# Patient Record
Sex: MALE | Race: OTHER
[De-identification: names, ages, dates, MRNs, and addresses within clinical notes are randomized per-mention and may not be internally consistent; named-entity substitution may affect disease eponyms.]

---

## 2019-01-24 ENCOUNTER — HOSPITAL ENCOUNTER (EMERGENCY)
Dept: HOSPITAL 72 - EMR | Age: 54
Discharge: HOME | End: 2019-01-24
Payer: COMMERCIAL

## 2019-01-24 VITALS — HEIGHT: 65 IN | WEIGHT: 150 LBS | BODY MASS INDEX: 24.99 KG/M2

## 2019-01-24 VITALS — SYSTOLIC BLOOD PRESSURE: 128 MMHG | DIASTOLIC BLOOD PRESSURE: 68 MMHG

## 2019-01-24 DIAGNOSIS — Z23: ICD-10-CM
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
## 2019-01-24 DIAGNOSIS — E11.9: ICD-10-CM

## 2019-01-24 DIAGNOSIS — S01.81XA: Primary | ICD-10-CM

## 2019-01-24 PROCEDURE — 90471 IMMUNIZATION ADMIN: CPT

## 2019-01-24 PROCEDURE — 99283 EMERGENCY DEPT VISIT LOW MDM: CPT

## 2019-01-24 PROCEDURE — 90715 TDAP VACCINE 7 YRS/> IM: CPT

## 2019-01-24 NOTE — NUR
ED Nurse Note:



wound cleaned, site intact and stitches inplace, pt discharge instruction and 
prescription provided, pt education done via discussion and hand out, pt 
verbalized understanding and agrees with plan, pt vss, ambulatory w/ steady 
gait.

## 2019-01-24 NOTE — NUR
ED Nurse Note:



pt walked in ED c/o lac upper lip area x2, pt states he was working out and one 
of the plastic part of workout equipment came off and cut above his lips and he 
came to ed to see if he needed stitches. Noted two [id number] lac on upper lip 
area, no drainage at this time, noted redness and swelling, airway intact, resp 
even and unlabored. will cont monitor.

## 2019-01-25 NOTE — EMERGENCY ROOM REPORT
History of Present Illness


General


Chief Complaint:  Laceration


Source:  Patient, Medical Record





Present Illness


HPI


Patient is a 53-year-old male who presented after increased facial pain.  

Patient reports having exercising with an elastic band.  Patient reports this 

striking him in the face.  He denies any loss of consciousness.  He denies 

taking any blood thinners.  Patient states that he is type II diabetic.  He 

reported having pain.  Primarily at his lips.  He denies recent tetanus 

vaccine.  He denies malocclusion.  He denies any other locations of injury.


Allergies:  


Coded Allergies:  


     No Known Allergies (Unverified , 6/10/14)





Patient History


Past Medical History:  see triage record


Reviewed Nursing Documentation:  PMH: Agreed; PSxH: Agreed





Nursing Documentation-PMH


Hx Cardiac Problems:  No


Hx Hypertension:  No


Hx Pacemaker:  No


Hx Asthma:  No


Hx COPD:  No


Hx Diabetes:  Yes


Hx Cancer:  No


Hx Gastrointestinal Problems:  No - HERNIA W/ REPAIR


Hx Dialysis:  No


Hx Neurological Problems:  No


Hx Cerebrovascular Accident:  No


Hx Seizures:  No





Review of Systems


All Other Systems:  negative except mentioned in HPI





Physical Exam





Vital Signs








  Date Time  Temp Pulse Resp B/P (MAP) Pulse Ox O2 Delivery O2 Flow Rate FiO2


 


1/24/19 15:43 98.1 79 16 144/84 95 Room Air  








General Appearance:  well appearing, no apparent distress, alert, GCS 15


Head:  normocephalic, atraumatic


ENT:  hearing grossly normal, normal voice, other - Superficial maxillary skin 

laceration above upper lip to the right side of his face 1 cm linear


Neck:  full range of motion, supple


Respiratory:  no respiratory distress, speaking full sentences


Musculoskeletal:  no calf tenderness


Neurologic:  normal gait


Psychiatric:  mood/affect normal


Skin:  no rash





Procedures


Laceration/Wound Repair


Laceration/Wound Repair :  


   Consent:  Verbal


   Wound Location:  face


   Wound's Depth, Shape:  superficial


   Wound Length (cm):  1


   Wound Explored:  clean


   Irrigated w/ Saline (ccs):  10


   Betadine Prep?:  Yes


   Anesthesia:  Lidocaine w/ Epi


   Volume Anesthetic (ccs):  2


   Wound Debrided:  minimal


   Wound Repaired With:  sutures


   Suture Size/Type:  5:0


   Number of Sutures:  4


   Layer Closure?:  No


   Sterile Dressing Applied?:  Yes


   Patient Tolerated:  Well


   Complications:  None


Progress


Patient's wound was closed with gut sutures.





Medical Decision Making


Diagnostic Impression:  


 Primary Impression:  


 Facial laceration


ER Course


Patient presented for laceration.  Differential diagnoses included foreign body

, nerve injury, arterial injury among others.  Patient has a benign exam and 

does not appear to require any further imaging or laboratory testing at this 

time.  Patient is noted to have no evidence of severe head trauma or facial 

injury requiring imaging.  Patient's laceration was irrigated and sutured with 

sterile technique.  Patient was advised to have the wound rechecked in the next 

few days.  Patient was advised to have sutures removed if they were persistent 

greater than 1 week.  Patient was advised wound care.





Last Vital Signs








  Date Time  Temp Pulse Resp B/P (MAP) Pulse Ox O2 Delivery O2 Flow Rate FiO2


 


1/24/19 17:36 98.0 68 16 128/68 100 Room Air  








Status:  improved


Disposition:  HOME, SELF-CARE


Condition:  Stable


Scripts


Bacitracin Zinc* (BACITRACIN ZINC*) 1 Each Packet


1 APPLIC TOPIC THREE TIMES A DAY, #30 PACKET


   Prov: Brian Gustafson MD         1/24/19


Referrals:  


[facility],REFERRING


Patient Instructions:  Facial Laceration











Brian Gustafson MD Jan 25, 2019 10:40